# Patient Record
Sex: MALE | Race: WHITE | NOT HISPANIC OR LATINO
[De-identification: names, ages, dates, MRNs, and addresses within clinical notes are randomized per-mention and may not be internally consistent; named-entity substitution may affect disease eponyms.]

---

## 2017-07-11 ENCOUNTER — RECORDS - HEALTHEAST (OUTPATIENT)
Dept: ADMINISTRATIVE | Facility: OTHER | Age: 42
End: 2017-07-11

## 2017-09-18 ENCOUNTER — OFFICE VISIT - HEALTHEAST (OUTPATIENT)
Dept: CARDIOLOGY | Facility: CLINIC | Age: 42
End: 2017-09-18

## 2017-09-18 ENCOUNTER — COMMUNICATION - HEALTHEAST (OUTPATIENT)
Dept: TELEHEALTH | Facility: CLINIC | Age: 42
End: 2017-09-18

## 2017-09-18 DIAGNOSIS — I30.9 ACUTE MYOPERICARDITIS: ICD-10-CM

## 2017-09-18 DIAGNOSIS — R07.9 CHEST PAIN, UNSPECIFIED TYPE: ICD-10-CM

## 2017-09-18 ASSESSMENT — MIFFLIN-ST. JEOR: SCORE: 1920.91

## 2017-09-19 ENCOUNTER — COMMUNICATION - HEALTHEAST (OUTPATIENT)
Dept: CARDIOLOGY | Facility: CLINIC | Age: 42
End: 2017-09-19

## 2017-09-19 DIAGNOSIS — I30.9 ACUTE MYOPERICARDITIS: ICD-10-CM

## 2018-04-23 ENCOUNTER — OFFICE VISIT - HEALTHEAST (OUTPATIENT)
Dept: CARDIOLOGY | Facility: CLINIC | Age: 43
End: 2018-04-23

## 2018-04-23 DIAGNOSIS — Z86.79 HISTORY OF PERICARDITIS: ICD-10-CM

## 2018-04-23 DIAGNOSIS — R07.89 ATYPICAL CHEST PAIN: ICD-10-CM

## 2018-04-23 LAB
ATRIAL RATE - MUSE: 73 BPM
CRP SERPL HS-MCNC: 2.2 MG/L (ref 0–3)
DIASTOLIC BLOOD PRESSURE - MUSE: NORMAL MMHG
INTERPRETATION ECG - MUSE: NORMAL
P AXIS - MUSE: 48 DEGREES
PR INTERVAL - MUSE: 152 MS
QRS DURATION - MUSE: 84 MS
QT - MUSE: 346 MS
QTC - MUSE: 381 MS
R AXIS - MUSE: 46 DEGREES
SYSTOLIC BLOOD PRESSURE - MUSE: NORMAL MMHG
T AXIS - MUSE: 33 DEGREES
VENTRICULAR RATE- MUSE: 73 BPM

## 2018-04-23 ASSESSMENT — MIFFLIN-ST. JEOR: SCORE: 1927.26

## 2018-05-07 ENCOUNTER — HOSPITAL ENCOUNTER (OUTPATIENT)
Dept: CARDIOLOGY | Facility: HOSPITAL | Age: 43
Discharge: HOME | End: 2018-05-07
Attending: INTERNAL MEDICINE

## 2018-05-07 DIAGNOSIS — Z86.79 HISTORY OF PERICARDITIS: ICD-10-CM

## 2018-05-07 DIAGNOSIS — R07.89 ATYPICAL CHEST PAIN: ICD-10-CM

## 2018-05-09 LAB
CV STRESS CURRENT BP HE: NORMAL
CV STRESS CURRENT HR HE: 101
CV STRESS CURRENT HR HE: 102
CV STRESS CURRENT HR HE: 104
CV STRESS CURRENT HR HE: 104
CV STRESS CURRENT HR HE: 105
CV STRESS CURRENT HR HE: 108
CV STRESS CURRENT HR HE: 109
CV STRESS CURRENT HR HE: 110
CV STRESS CURRENT HR HE: 110
CV STRESS CURRENT HR HE: 113
CV STRESS CURRENT HR HE: 113
CV STRESS CURRENT HR HE: 115
CV STRESS CURRENT HR HE: 116
CV STRESS CURRENT HR HE: 116
CV STRESS CURRENT HR HE: 127
CV STRESS CURRENT HR HE: 133
CV STRESS CURRENT HR HE: 135
CV STRESS CURRENT HR HE: 136
CV STRESS CURRENT HR HE: 140
CV STRESS CURRENT HR HE: 154
CV STRESS CURRENT HR HE: 156
CV STRESS CURRENT HR HE: 165
CV STRESS CURRENT HR HE: 166
CV STRESS CURRENT HR HE: 167
CV STRESS CURRENT HR HE: 167
CV STRESS CURRENT HR HE: 174
CV STRESS CURRENT HR HE: 76
CV STRESS CURRENT HR HE: 77
CV STRESS CURRENT HR HE: 81
CV STRESS CURRENT HR HE: 94
CV STRESS CURRENT HR HE: 96
CV STRESS CURRENT HR HE: 97
CV STRESS CURRENT HR HE: 98
CV STRESS DEVIATION TIME HE: NORMAL
CV STRESS ECHO PERCENT HR HE: NORMAL
CV STRESS EXERCISE STAGE HE: NORMAL
CV STRESS FINAL RESTING BP HE: NORMAL
CV STRESS FINAL RESTING HR HE: 94
CV STRESS MAX HR HE: 176
CV STRESS MAX TREADMILL GRADE HE: 14
CV STRESS MAX TREADMILL SPEED HE: 3.4
CV STRESS PEAK DIA BP HE: NORMAL
CV STRESS PEAK SYS BP HE: NORMAL
CV STRESS PHASE HE: NORMAL
CV STRESS PROTOCOL HE: NORMAL
CV STRESS RESTING PT POSITION HE: NORMAL
CV STRESS ST DEVIATION AMOUNT HE: NORMAL
CV STRESS ST DEVIATION ELEVATION HE: NORMAL
CV STRESS ST EVELATION AMOUNT HE: NORMAL
CV STRESS TEST TYPE HE: NORMAL
CV STRESS TOTAL STAGE TIME MIN 1 HE: NORMAL
ECHO EJECTION FRACTION ESTIMATED: 55 %
STRESS ECHO BASELINE BP: NORMAL
STRESS ECHO BASELINE HR: 74
STRESS ECHO CALCULATED PERCENT HR: 99 %
STRESS ECHO LAST STRESS BP: NORMAL
STRESS ECHO LAST STRESS HR: 174
STRESS ECHO POST ESTIMATED WORKLOAD: 10.3
STRESS ECHO POST EXERCISE DUR MIN: 8
STRESS ECHO POST EXERCISE DUR SEC: 59
STRESS ECHO TARGET HR: 150

## 2021-05-31 VITALS — HEIGHT: 69 IN | WEIGHT: 231.6 LBS | BODY MASS INDEX: 34.3 KG/M2

## 2021-06-01 VITALS — WEIGHT: 233 LBS | HEIGHT: 69 IN | BODY MASS INDEX: 34.51 KG/M2

## 2021-06-25 NOTE — PROGRESS NOTES
Progress Notes by Gurpreet Leavitt DO at 9/18/2017  4:10 PM     Author: Gurpreet Leavitt DO Service: -- Author Type: Physician    Filed: 9/18/2017  4:30 PM Encounter Date: 9/18/2017 Status: Signed    : Gurpreet Leavitt DO (Physician)           Click to link to [facility] HEART CARE NOTE      Assessment/Recommendations   Assessment:    1.  Myopericarditis.  Patient is noted complete resolution of his positional chest pain.  He remains on colchicine therapy (10 weeks)  He has not taken any ibuprofen for pain. CRP:13 in July     Plan:  1.  Given patient is nearing completion of his course of colchicine recommend checking a CRP level was determined that has normalized.  If normalized we will have patient wean off colchicine therapy.       History of Present Illness    Mr. Santiago Mitchell is a 41 y.o. male with recent history of myopericarditis confirmed by cardiac MRI July 2017.    Since being discharged from hospital on colchicine therapy the patient is noted complete resolution of his positional chest pain.  His pain was worse with lying flat.  Resolved with sitting upright.  He has been compliant with colchicine therapy for the past 10 weeks.  Patient is return to normal activity levels.    During his hospitalization cardiac MRI demonstrated pericardial thickening with active inflammation.  Currently denies any palpitations, chest pain, orthopnea or PND symptoms.       Physical Examination Review of Systems   Vitals:    09/18/17 1609   BP: 124/82   Pulse: 72   Resp: 16     Body mass index is 34.2 kg/(m^2).  Wt Readings from Last 3 Encounters:   09/18/17 (!) 231 lb 9.6 oz (105.1 kg)   07/12/17 219 lb 1.6 oz (99.4 kg)       General Appearance:   no distress, normal body habitus   ENT/Mouth: membranes moist, no oral lesions or bleeding gums.      EYES:  no scleral icterus, normal conjunctivae   Neck: no carotid bruits or thyromegaly   Chest/Lungs:   lungs are  clear to auscultation, no rales or wheezing, no sternal scar, equal chest wall expansion    Cardiovascular:   Regular. Normal first and second heart sounds with no murmurs. No rubs. No  gallops; the carotid, radial and posterior tibial pulses are intact, Jugular venous pressure normal, no edema bilaterally    Abdomen:  no organomegaly, masses, bruits, or tenderness; bowel sounds are present   Extremities: no cyanosis or clubbing   Skin: no xanthelasma, warm.    Neurologic: normal gait, normal  bilateral, no tremors     Psychiatric: alert and oriented x3, calm     General: WNL  Eyes: WNL  Ears/Nose/Throat: WNL  Lungs: WNL  Heart: WNL  Stomach: WNL  Bladder: WNL  Muscle/Joints: WNL  Skin: WNL  Nervous System: WNL  Mental Health: WNL     Blood: WNL     Medical History  Surgical History Family History Social History   Past Medical History:   Diagnosis Date   ? Acute myopericarditis     Past Surgical History:   Procedure Laterality Date   ? WISDOM TOOTH EXTRACTION      Family History   Problem Relation Age of Onset   ? Coronary artery disease Paternal Aunt     Social History     Social History   ? Marital status:      Spouse name: N/A   ? Number of children: N/A   ? Years of education: N/A     Occupational History   ? Not on file.     Social History Main Topics   ? Smoking status: Never Smoker   ? Smokeless tobacco: Not on file   ? Alcohol use No   ? Drug use: No   ? Sexual activity: Not on file     Other Topics Concern   ? Not on file     Social History Narrative          Medications  Allergies   Current Outpatient Prescriptions   Medication Sig Dispense Refill   ? colchicine 0.6 mg tablet Take 1 tablet (0.6 mg total) by mouth 2 (two) times a day. 180 tablet 0   ? acetaminophen (TYLENOL) 325 MG tablet Take 650 mg by mouth every 6 (six) hours as needed for pain.       No current facility-administered medications for this visit.       No Known Allergies      Lab Results    Chemistry/lipid CBC Cardiac  Enzymes/BNP/TSH/INR   Lab Results   Component Value Date    CREATININE 0.94 07/12/2017    BUN 17 07/12/2017    K 3.9 07/12/2017     07/12/2017     07/12/2017    CO2 24 07/12/2017    Lab Results   Component Value Date    WBC 10.0 07/11/2017    HGB 14.4 07/11/2017    HCT 41.7 07/11/2017    MCV 84 07/11/2017     07/11/2017    Lab Results   Component Value Date    TROPONINI 1.54 () 07/11/2017    INR 1.01 07/11/2017

## 2021-06-26 NOTE — PROGRESS NOTES
Progress Notes by Gurpreet Leavitt DO at 4/23/2018  1:10 PM     Author: Gurpreet Leavitt DO Service: -- Author Type: Physician    Filed: 4/23/2018  1:58 PM Encounter Date: 4/23/2018 Status: Signed    : Gurpreet Leavitt DO (Physician)           Click to link to [facility]     [facility] HEART CARE NOTE      Assessment/Recommendations   Assessment:    1.  History of myopericarditis.    2.  Atypical chest pain.  Patient was noted mild constant chest pain which she feels is worsened after prolonged periods of activity.  Given history of myopericarditis screen for inflammatory markers, twelve-lead EKG. If CRP is normal would recommend exercise stress testing.    Plan:  1.  CRP  2.  12 EKG today.  EKG today was reassuring did not demonstrate diffuse ST elevations or ME depression as was noted on prior episode of myopericarditis.  3.  Exercise echo stress testing recommend imaging given patient had mild left ventricular systolic dysfunction on prior echo.  Need to assess small pericardial effusion in the past.       History of Present Illness    Mr. Santiago Mitchell is a 42 y.o. male with prior history of mild pericarditis who presents in cardiology clinic for new onset of chest pain over the past week.    In July 2017 the patient presented with positional chest pain had diffuse ST elevations associated with markedly elevated CRP level.  Troponin level was 1.7 during his hospitalization in July 2017.  Cardiac MRI was consistent with myopericarditis.  He underwent treatment with colchicine for 12 weeks which resulted in resolution of his symptoms.  CRP level was 0.7 after completion of colchicine.  He did well from August until last week when he is noticed mild chest discomfort which he states is constant.  He says with prolonged exercise activities he notes worsening of his chest pain which continues for approximately an hour.  He did take an aspirin today.    Patient denies any  fevers chills or sweats.  He denies any lower extremity edema, orthopnea or PND symptoms.    He denies any palpitations.  Denies any near-syncope or syncopal episodes.    CARDIAC MRI: 7/11/2017  IMPRESSIONS:    1.  Normal left ventricular size, wall thickness and systolic function. The quantified left ventricular ejection fraction is 55.2%. Small area of epicardial late gadolinium enhancement involving the inferior and inferolateral wall consistent with myocarditis.      2.  Normal right ventricular size function.    3.  No significant valvular abnormalities  4. Trace pericardial effusion. There is mild irregular thickening of the pericardium near RV free wall and apex.  5.  Finding are consistent with mild myopericarditis     Physical Examination Review of Systems   Vitals:    04/23/18 1315   BP: 108/80   Pulse: 75   Resp: 14     Body mass index is 34.41 kg/(m^2).  Wt Readings from Last 3 Encounters:   04/23/18 (!) 233 lb (105.7 kg)   09/18/17 (!) 231 lb 9.6 oz (105.1 kg)   07/12/17 219 lb 1.6 oz (99.4 kg)       General Appearance:   no distress, normal body habitus   ENT/Mouth: membranes moist, no oral lesions or bleeding gums.      EYES:  no scleral icterus, normal conjunctivae   Neck: no carotid bruits or thyromegaly   Chest/Lungs:   lungs are clear to auscultation, no rales or wheezing, no sternal scar, equal chest wall expansion    Cardiovascular:   Regular. Normal first and second heart sounds with no murmurs. No rubs. No  gallops; the carotid, radial and posterior tibial pulses are intact, Jugular venous pressure normal, no edema bilaterally    Abdomen:  no tenderness; bowel sounds are present   Extremities: no cyanosis or clubbing   Skin: no xanthelasma, warm.    Neurologic: normal gait, normal  bilateral, no tremors     Psychiatric: alert and oriented x3, calm     General: WNL  Eyes: WNL  Ears/Nose/Throat: WNL  Lungs: WNL  Heart: Chest Pain, Shortness of Breath with activity  Stomach: WNL  Bladder:  WNL  Muscle/Joints: WNL  Skin: WNL  Nervous System: WNL  Mental Health: WNL     Blood: WNL     Medical History  Surgical History Family History Social History   Past Medical History:   Diagnosis Date   ? Acute myopericarditis     Past Surgical History:   Procedure Laterality Date   ? WISDOM TOOTH EXTRACTION      Family History   Problem Relation Age of Onset   ? Coronary artery disease Paternal Aunt     Social History     Social History   ? Marital status:      Spouse name: N/A   ? Number of children: N/A   ? Years of education: N/A     Occupational History   ? Not on file.     Social History Main Topics   ? Smoking status: Never Smoker   ? Smokeless tobacco: Not on file   ? Alcohol use No   ? Drug use: No   ? Sexual activity: Not on file     Other Topics Concern   ? Not on file     Social History Narrative          Medications  Allergies   Current Outpatient Prescriptions   Medication Sig Dispense Refill   ? acetaminophen (TYLENOL) 325 MG tablet Take 650 mg by mouth every 6 (six) hours as needed for pain.       No current facility-administered medications for this visit.       No Known Allergies      Lab Results    Chemistry/lipid CBC Cardiac Enzymes/BNP/TSH/INR   Lab Results   Component Value Date    CREATININE 0.94 07/12/2017    BUN 17 07/12/2017    K 3.9 07/12/2017     07/12/2017     07/12/2017    CO2 24 07/12/2017    Lab Results   Component Value Date    WBC 10.0 07/11/2017    HGB 14.4 07/11/2017    HCT 41.7 07/11/2017    MCV 84 07/11/2017     07/11/2017    Lab Results   Component Value Date    TROPONINI 1.54 (HH) 07/11/2017    INR 1.01 07/11/2017